# Patient Record
Sex: FEMALE | Race: WHITE | NOT HISPANIC OR LATINO | Employment: UNEMPLOYED | URBAN - METROPOLITAN AREA
[De-identification: names, ages, dates, MRNs, and addresses within clinical notes are randomized per-mention and may not be internally consistent; named-entity substitution may affect disease eponyms.]

---

## 2020-06-22 ENCOUNTER — OFFICE VISIT (OUTPATIENT)
Dept: ORTHOPEDIC SURGERY | Facility: CLINIC | Age: 70
End: 2020-06-22

## 2020-06-22 VITALS — OXYGEN SATURATION: 98 % | BODY MASS INDEX: 29.83 KG/M2 | HEIGHT: 67 IN | WEIGHT: 190.04 LBS | HEART RATE: 70 BPM

## 2020-06-22 DIAGNOSIS — Z02.6 ENCOUNTER RELATED TO WORKER'S COMPENSATION CLAIM: ICD-10-CM

## 2020-06-22 DIAGNOSIS — S52.515A CLOSED NONDISPLACED FRACTURE OF STYLOID PROCESS OF LEFT RADIUS, INITIAL ENCOUNTER: ICD-10-CM

## 2020-06-22 DIAGNOSIS — M25.532 LEFT WRIST PAIN: Primary | ICD-10-CM

## 2020-06-22 DIAGNOSIS — M18.12 ARTHRITIS OF CARPOMETACARPAL (CMC) JOINT OF LEFT THUMB: ICD-10-CM

## 2020-06-22 PROCEDURE — 99204 OFFICE O/P NEW MOD 45 MIN: CPT | Performed by: ORTHOPAEDIC SURGERY

## 2020-06-22 NOTE — PROGRESS NOTES
"    [facility]    Subjective     Chief Complaint   Patient presents with   • Left Wrist - Pain        HPI  Kathy Bergeron is a 69 y.o. female who presents with left wrist pain.  Onset: mechanical fall. The issue has been ongoing for 1 month(s). Pain is a 0/10 on the pain scale. Pain is described as stabbing and shooting. Associated symptoms include pain. The pain is worse with working and leisure; resting, ice and heat improve the pain. Previous treatments have included: bracing.    I have reviewed the following portions of the patient's history:History of Present Illness    She had no pre-existing left wrist pain.  Past Medical History:   Diagnosis Date   • Breast cancer (CMS/HCC)    • Diabetes mellitus (CMS/HCC)       Past Surgical History:   Procedure Laterality Date   • ANKLE OPEN REDUCTION INTERNAL FIXATION Left     2008, 2010   • BREAST BIOPSY     • BREAST LUMPECTOMY  10/2019   • HYSTERECTOMY  2004   • TONSILLECTOMY  1954      Family History   Problem Relation Age of Onset   • Hypertension Mother    • Diabetes Mother    • Heart disease Father    • Diabetes Father      Social History     Socioeconomic History   • Marital status:      Spouse name: Not on file   • Number of children: Not on file   • Years of education: Not on file   • Highest education level: Not on file   Tobacco Use   • Smoking status: Never Smoker   • Smokeless tobacco: Never Used   Substance and Sexual Activity   • Alcohol use: Yes     Frequency: Never     Comment: once a year   • Drug use: Never   • Sexual activity: Defer      Current Outpatient Medications on File Prior to Visit   Medication Sig Dispense Refill   • amLODIPine (NORVASC) 10 MG tablet      • atorvastatin (LIPITOR) 10 MG tablet Take 10 mg by mouth Daily.     • BD INSULIN SYRINGE U/F 31G X 5/16\" 1 ML misc      • hydroCHLOROthiazide (HYDRODIURIL) 25 MG tablet      • labetalol (NORMODYNE) 200 MG tablet      • LANTUS 100 UNIT/ML injection      • " "losartan (COZAAR) 100 MG tablet      • ONETOUCH ULTRA test strip      • rosuvastatin (CRESTOR) 10 MG tablet      • VICTOZA 18 MG/3ML solution pen-injector injection        No current facility-administered medications on file prior to visit.       Allergies   Allergen Reactions   • Oxycodone-Acetaminophen Rash   • Lipitor  [Atorvastatin Calcium] Dizziness   • Lotensin  [Benazepril Hcl] Dizziness        The following portions of the patient's history were reviewed and updated as appropriate: allergies, current medications, past family history, past medical history, past social history, past surgical history and problem list.    Review of Systems   Constitutional: Negative.    HENT: Negative.    Eyes: Negative.    Respiratory: Negative.    Cardiovascular: Negative.    Gastrointestinal: Negative.    Endocrine: Negative.    Genitourinary: Negative.    Musculoskeletal: Positive for arthralgias.   Skin: Negative.    Allergic/Immunologic: Negative.    Neurological: Negative.    Hematological: Negative.    Psychiatric/Behavioral: Negative.         Objective      Physical Exam  Pulse 70   Ht 170.2 cm (67.01\")   Wt 86.2 kg (190 lb 0.6 oz)   SpO2 98%   BMI 29.76 kg/m²     Body mass index is 29.76 kg/m².    GENERAL APPEARANCE: awake, alert & oriented x 3, in no acute distress and well developed, well nourished  PSYCH: normal mood and affect  LUNGS:  breathing nonlabored, no wheezing  EYES: sclera anicteric, pupils equal  CARDIOVASCULAR: palpable pulses dorsalis pedis, palpable posterior tibial bilaterally. Capillary refill less than 2 seconds  INTEGUMENTARY: skin intact, no clubbing, cyanosis  NEUROLOGIC:  Normal Sensation and reflexes       Ortho Exam  Peripheral Vascular   Left Upper Extremity    No cyanotic nail beds    Pink nail beds and rapid capillary refill   Palpation    Radial Pulse - Bilaterally normal    Musculoskeletal   Left Upper Extremity   Radius:    Inspection and Palpation:    Tenderness - exquisitely " tender and about the wrist    Swelling - hematoma    Effusion - none    Muscle tone - no atrophy    Pulses - +2   Deformities/Malalignments/Discrepancies    Normal bony contour    There is a documented closed fracture : location - left - distal end        Imaging/Studies  Imaging Results (Last 7 Days)     Procedure Component Value Units Date/Time    XR Wrist 3+ View Left [[id number]] Resulted:  06/22/20 0916     Updated:  06/22/20 0919    Narrative:       Left Wrist X-Ray  Indication: Pain  AP, Lateral, and Oblique views    Findings:    Hairline fracture of the radial styloid.  Extensive CMC joint and scaphoid   carpal joint arthritis     prior studies were available for comparison.            Assessment/Plan        ICD-10-CM ICD-9-CM   1. Left wrist pain M25.532 719.43   2. Encounter related to worker's compensation claim Z02.6 V70.3   3. Closed nondisplaced fracture of styloid process of left radius, initial encounter S52.515A 813.42   4. Arthritis of carpometacarpal (CMC) joint of left thumb M18.12 716.94       Orders Placed This Encounter   Procedures   • XR Wrist 3+ View Left      She has pretty severe CMC joint arthritis.  This was obviously pre-existing injury.  The injury may have aggravated that in addition to that she has healing of a nondisplaced radial styloid fracture.  I ordered a plan based thumb spica splint her work restrictions are no lifting left arm.  She will follow-up in 4 weeks for an x-ray.  Medical Decision Making  Management Options : over-the-counter medicine and close treatment of fracture or dislocation  Data/Risk: radiology tests and independent visualization of imaging, lab tests, or EMG/NCV    Perry [facility] MD  06/22/20  09:20         EMR Dragon/Transcription disclaimer:  Much of this encounter note is an electronic transcription of spoken language to printed text. Electronic transcription of spoken language may permit erroneous, or at times, nonsensical words or phrases to be  inadvertently transcribed. Although I have reviewed the note for such errors, some may still exist.

## 2020-07-20 ENCOUNTER — OFFICE VISIT (OUTPATIENT)
Dept: ORTHOPEDIC SURGERY | Facility: CLINIC | Age: 70
End: 2020-07-20

## 2020-07-20 VITALS — BODY MASS INDEX: 29.7 KG/M2 | OXYGEN SATURATION: 98 % | HEIGHT: 67 IN | HEART RATE: 83 BPM | WEIGHT: 189.2 LBS

## 2020-07-20 DIAGNOSIS — S52.515D CLOSED NONDISPLACED FRACTURE OF STYLOID PROCESS OF LEFT RADIUS WITH ROUTINE HEALING, SUBSEQUENT ENCOUNTER: ICD-10-CM

## 2020-07-20 DIAGNOSIS — Z02.6 ENCOUNTER RELATED TO WORKER'S COMPENSATION CLAIM: ICD-10-CM

## 2020-07-20 DIAGNOSIS — M25.532 LEFT WRIST PAIN: ICD-10-CM

## 2020-07-20 DIAGNOSIS — Z09 FRACTURE FOLLOW-UP: Primary | ICD-10-CM

## 2020-07-20 PROCEDURE — 99213 OFFICE O/P EST LOW 20 MIN: CPT | Performed by: ORTHOPAEDIC SURGERY

## 2020-07-20 NOTE — PROGRESS NOTES
"    [facility]    Subjective     Chief Complaint   Patient presents with   • Follow-up     4 week recheck - Closed nondisplaced fracture of styloid process of left radius        HPI  Kathy Bergeron is a 69 y.o. female.  She is follow-up the left wrist radial styloid fracture.  She is Worker's Comp. injury from 831.  She feels she is doing better.  She has been in a thumb spica brace.  Past Medical History:   Diagnosis Date   • Breast cancer (CMS/HCC)    • Diabetes mellitus (CMS/HCC)       Past Surgical History:   Procedure Laterality Date   • ANKLE OPEN REDUCTION INTERNAL FIXATION Left     2008, 2010   • BREAST BIOPSY     • BREAST LUMPECTOMY  10/2019   • HYSTERECTOMY  2004   • TONSILLECTOMY  1954      Family History   Problem Relation Age of Onset   • Hypertension Mother    • Diabetes Mother    • Heart disease Father    • Diabetes Father      Social History     Socioeconomic History   • Marital status:      Spouse name: Not on file   • Number of children: Not on file   • Years of education: Not on file   • Highest education level: Not on file   Tobacco Use   • Smoking status: Never Smoker   • Smokeless tobacco: Never Used   Substance and Sexual Activity   • Alcohol use: Yes     Frequency: Never     Comment: once a year   • Drug use: Never   • Sexual activity: Defer      Current Outpatient Medications on File Prior to Visit   Medication Sig Dispense Refill   • amLODIPine (NORVASC) 10 MG tablet      • atorvastatin (LIPITOR) 10 MG tablet Take 10 mg by mouth Daily.     • BD INSULIN SYRINGE U/F 31G X 5/16\" 1 ML misc      • hydroCHLOROthiazide (HYDRODIURIL) 25 MG tablet      • labetalol (NORMODYNE) 200 MG tablet      • LANTUS 100 UNIT/ML injection      • losartan (COZAAR) 100 MG tablet      • ONETOUCH ULTRA test strip      • rosuvastatin (CRESTOR) 10 MG tablet      • VICTOZA 18 MG/3ML solution pen-injector injection        No current facility-administered medications on file prior to visit. " "      Allergies   Allergen Reactions   • Oxycodone-Acetaminophen Rash   • Lipitor  [Atorvastatin Calcium] Dizziness   • Lotensin  [Benazepril Hcl] Dizziness        The following portions of the patient's history were reviewed and updated as appropriate: allergies, current medications, past family history, past medical history, past social history, past surgical history and problem list.    Review of Systems   Constitutional: Negative.    HENT: Negative.    Eyes: Negative.    Respiratory: Negative.    Cardiovascular: Negative.    Gastrointestinal: Negative.    Endocrine: Negative.    Genitourinary: Negative.    Musculoskeletal: Positive for arthralgias and joint swelling.   Skin: Negative.    Allergic/Immunologic: Negative.    Neurological: Negative.    Hematological: Negative.    Psychiatric/Behavioral: Negative.         Objective      Physical Exam  Pulse 83   Ht 170.2 cm (67.01\")   Wt 85.8 kg (189 lb 3.2 oz)   SpO2 98%   BMI 29.63 kg/m²     Body mass index is 29.63 kg/m².    GENERAL APPEARANCE: awake, alert & oriented x 3, in no acute distress and well developed, well nourished  PSYCH: normal mood and affect  LUNGS:  breathing nonlabored, no wheezing  Her left wrist has stiffness and weakness.  Minimal tenderness at fracture site.  Minimal swelling at the fracture site.    Imaging/Studies  Imaging Results (Last 7 Days)     Procedure Component Value Units Date/Time    XR Wrist 3+ View Left [[id number]] Resulted:  07/20/20 0943     Updated:  07/20/20 0948    Narrative:       Left Wrist X-Ray  Indication: Pain  AP, Lateral, and Oblique views    Findings:  Healing of radial styloid fracture with the pre-existing CMC joint   arthritis  No bony lesion  Normal soft tissues  prior studies were available for comparison.            Assessment/Plan        ICD-10-CM ICD-9-CM   1. Fracture follow-up Z09 V67.4   2. Left wrist pain M25.532 719.43   3. Encounter related to worker's compensation claim Z02.6 V70.3   4. Closed " nondisplaced fracture of styloid process of left radius with routine healing, subsequent encounter S52.515D V54.12       Orders Placed This Encounter   Procedures   • XR Wrist 3+ View Left   • Ambulatory Referral to Physical Therapy Evaluate and treat, Ortho      She will go to physical therapy at [facility].  She will follow-up in 1 month.  Her work restrictions are no lifting more than 5 pounds.  Medical Decision Making  Management Options : over-the-counter medicine and physical/occupational therapy  Data/Risk: radiology tests and independent visualization of imaging, lab tests, or EMG/NCV    Perry [facility] MD  07/20/20  09:51         EMR Dragon/Transcription disclaimer:  Much of this encounter note is an electronic transcription of spoken language to printed text. Electronic transcription of spoken language may permit erroneous, or at times, nonsensical words or phrases to be inadvertently transcribed. Although I have reviewed the note for such errors, some may still exist.

## 2020-07-28 ENCOUNTER — TREATMENT (OUTPATIENT)
Dept: PHYSICAL THERAPY | Facility: CLINIC | Age: 70
End: 2020-07-28

## 2020-07-28 DIAGNOSIS — M25.532 LEFT WRIST PAIN: Primary | ICD-10-CM

## 2020-07-28 PROCEDURE — 97110 THERAPEUTIC EXERCISES: CPT | Performed by: PHYSICAL THERAPIST

## 2020-07-28 PROCEDURE — 97162 PT EVAL MOD COMPLEX 30 MIN: CPT | Performed by: PHYSICAL THERAPIST

## 2020-07-28 NOTE — PROGRESS NOTES
Physical Therapy Initial Evaluation and Plan of Care      Patient: Kathy Bergeron   : 1950  Diagnosis/ICD-10 Code:  The encounter diagnosis was Left wrist pain.   Referring practitioner: Perry [facility] MD  Date of Initial Visit: Type: THERAPY  Noted: 2020  Today's Date: 2020  Patient seen for 1 sessions         Kathy Bergeron reports:  L wrist pain, decreased ROM, decreased strength and functional abilities following fracture with immobilization.  Subjective Questionnaire: QuickDASH: 40    Subjective Evaluation    History of Present Illness  Date of onset: 2020  Mechanism of injury: Pt fell and caught herself on her L hand while working at [employer].  She has been able to continue to work in a light duty.  She has 5# lifting restriction with L hand.    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  Has been working as a  since the injury to her wrist. Pain  Current pain ratin  At best pain ratin  At worst pain ratin  Location: L wrist  Quality: tight  Relieving factors: rest, support and change in position  Exacerbated by: pronation/supination.  Progression: improved    Hand dominance: right    Diagnostic Tests  Abnormal x-ray: healing radial styloid fracture L wrist.    Treatments  Previous treatment: immobilization  Patient Goals  Patient goals for therapy: increased strength and increased motion           Treatment  Exercise 1  Exercise Name 1: Initial HEP education provided, including illustrations and practice in clinic.              Objective          Palpation     Additional Palpation Details  Generalized tenderness at radial aspect of left wrist.    Neurological Testing     Sensation     Wrist/Hand   Left   Intact: light touch    Right   Intact: light touch    Active Range of Motion     Left Elbow   Forearm supination: 71 degrees    Forearm pronation: 75 degrees     Right Elbow   Forearm supination: 90 degrees   Forearm pronation: 85 degrees     Left Wrist   Wrist flexion: 45 degrees   Wrist extension: 50 degrees   Radial deviation: 15 degrees   Ulnar deviation: 25 degrees     Right Wrist   Wrist flexion: 60 degrees   Wrist extension: 64 degrees   Radial deviation: 20 degrees   Ulnar deviation: 32 degrees     Additional Active Range of Motion Details  Able to fully open and close fist.    Strength/Myotome Testing     Left Wrist/Hand   Wrist extension: 4  Wrist flexion: 4  Radial deviation: 4  Ulnar deviation: 4     (2nd hand position)     Trial 1: 21 lbs    Trial 2: 30 lbs    Trial 3: 27 lbs    Average: 26 lbs    Thumb Strength  Key/Lateral Pinch     Trial 1: 9 lbs  Tip/Two-Point Pinch     Trial 1: 7 lbs  Palmar/Three-Point Pinch     Trial 1: 7 lbs    Right Wrist/Hand   Normal wrist strength     (2nd hand position)     Trial 1: 55 lbs    Trial 2: 60 lbs    Trial 3: 68 lbs    Average: 61 lbs    Thumb Strength   Key/Lateral Pinch     Trial 1: 21 lbs  Tip/Two-Point Pinch     Trial 1: 12 lbs  Palmar/Three-Point Pinch     Trial 1: 14 lbs          Assessment & Plan     Assessment  Impairments: abnormal or restricted ROM, activity intolerance, impaired physical strength, lacks appropriate home exercise program and pain with function  Assessment details: Pt sustained L radial styloid fracture in a fall on 5-.  She has been immobilized in splint and presents today to begin rehabilitation.  Functional Limitations: carrying objects, lifting, pulling, pushing, uncomfortable because of pain and unable to perform repetitive tasks  Goals  Plan Goals: Pt. demonstrates independence and compliance with initial HEP.  Pt. reports reduction in pain intensity to no worse than 3/10 on NPRS.  AROM of L wrist shows improvement over baseline measures.  Functional outcome measure is improved by 10 points.    Pt. demonstrates independence in advanced HEP  for ongoing improvement.  AROM of L wrist is sufficient for performance of daily activities.  L wrist and hand strength is improved to 4+/5 or better to allow participation in desired activities.  Functional outcome measure is improved to <20 points.          Plan  Therapy options: will be seen for skilled physical therapy services  Planned modality interventions: cryotherapy, thermotherapy (hydrocollator packs) and iontophoresis  Planned therapy interventions: manual therapy, neuromuscular re-education, strengthening, stretching, therapeutic activities, joint mobilization, home exercise program, functional ROM exercises and flexibility  Frequency: 2x week  Duration in visits: 12  Treatment plan discussed with: patient  Plan details: PT per POC, addressing noted deficits in ROM, strength, and functional abilities of left wrist and hand.        Timed:  Manual Therapy:         mins  39318;  Therapeutic Exercise:    10     mins  01958;     Neuromuscular Tiara:        mins  56409;    Therapeutic Activity:          mins  38837;     Gait Training:           mins  67920;     Ultrasound:          mins  31620;    Electrical Stimulation:         mins  37697 ( );    Untimed:  Electrical Stimulation:         mins  37391 ( );  Mechanical Traction:         mins  07831;     Timed Treatment:   10   mins   Total Treatment:     50   mins    PT SIGNATURE: Pepper Galindo, BREA   DATE TREATMENT INITIATED: 7/28/2020    Initial Certification  Certification Period: 10/26/2020  I certify that the therapy services are furnished while this patient is under my care.  The services outlined above are required by this patient, and will be reviewed every 90 days.     PHYSICIAN: Perry [facility] MD      DATE:     Please sign and return via fax to  .. Thank you, [facility].

## 2020-08-04 ENCOUNTER — TREATMENT (OUTPATIENT)
Dept: PHYSICAL THERAPY | Facility: CLINIC | Age: 70
End: 2020-08-04

## 2020-08-04 DIAGNOSIS — M25.532 LEFT WRIST PAIN: Primary | ICD-10-CM

## 2020-08-04 PROCEDURE — 97110 THERAPEUTIC EXERCISES: CPT | Performed by: PHYSICAL THERAPIST

## 2020-08-04 PROCEDURE — 97140 MANUAL THERAPY 1/> REGIONS: CPT | Performed by: PHYSICAL THERAPIST

## 2020-08-04 NOTE — PROGRESS NOTES
Physical Therapy Daily Progress Note  VISIT: 2      Kathy Bergeron reports: she has been doing her exercises.  She wants to make sure she is doing them correctly.    Subjective     Treatment  Pre-treatment pain:  0  Post-treatment pain:  0  Exercise 1  Exercise Name 1: Brief review of existing HEP.  Exercise 2  Exercise Name 2: Progression of HEP to include strengthening of wrist.  Exercise 3  Exercise Name 3: wrist flexion  Equipment/Resistance 3: yellow band  Exercise 3 Home Program: Yes  Exercise 4  Exercise Name 4: wrist extension  Equipment/Resistance 4: yellow band  Exercise 4 Home Program: Yes  Exercise 5  Exercise Name 5: radial deviation  Equipment/Resistance 5: yellow band  Exercise 5 Home Program: Yes  Exercise 6  Exercise Name 6: ulnar deviation  Equipment/Resistance 6: yellow band  Exercise 6 Home Program: Yes  Exercise 7  Exercise Name 7: supination  Equipment/Resistance 7: yellow band  Exercise 7 Home Program: Yes  Exercise 8  Exercise Name 8: pronation  Equipment/Resistance 8: yellow band  Exercise 8 Home Program: Yes    Manual Rx 1  Manual Rx 1 Location: L wrist  Manual Rx 1 Type: multi-directional mobilization of L wrist complex to improve motion in all planes.        Objective     Assessment & Plan     Assessment  Assessment details: Pt is compliant with HEP and motivated to return to her usual job duties in [employer].    Plan  Plan details: Continue PT.  Progress exercises as patient tolerates.               Timed:  Manual Therapy:    15     mins  74481;  Therapeutic Exercise:    25     mins  06172;     Neuromuscular Tiara:        mins  52436;    Therapeutic Activity:          mins  02885;     Gait Training:           mins  85522;     Ultrasound:          mins  98893;    Electrical Stimulation:         mins  35538 ( );    Untimed:  Electrical Stimulation:         mins  13545 ( );  Mechanical Traction:         mins  48271;     Timed Treatment:   40   mins   Total Treatment:      40   mins      Pepper Galindo, PT  Physical Therapist

## 2020-08-10 ENCOUNTER — TREATMENT (OUTPATIENT)
Dept: PHYSICAL THERAPY | Facility: CLINIC | Age: 70
End: 2020-08-10

## 2020-08-10 DIAGNOSIS — M25.532 LEFT WRIST PAIN: Primary | ICD-10-CM

## 2020-08-10 PROCEDURE — 97140 MANUAL THERAPY 1/> REGIONS: CPT | Performed by: PHYSICAL THERAPIST

## 2020-08-10 PROCEDURE — 97110 THERAPEUTIC EXERCISES: CPT | Performed by: PHYSICAL THERAPIST

## 2020-08-10 NOTE — PROGRESS NOTES
Physical Therapy Daily Progress Note  VISIT: 3      Kathy Bergeron reports: no pain upon arrival today.  She likes her home exercises.  She has not done as much with the putty as she has with theraband.  She is eager to get back to her usual job, and feels that she could do it.    Subjective     Treatment  Pre-treatment pain:  0  Post-treatment pain:  0  Exercise 1  Exercise Name 1: wrist exerciser   Exercise 2  Exercise Name 2: power web -green  Exercise 3  Exercise Name 3: velcro board wrist flex/ext  Exercise 4  Exercise Name 4: velcro board forearm pronation and supination  Exercise 5  Exercise Name 5: velcro board-key  and turn  Exercise 6  Exercise Name 6: hand/finger exerciser  Equipment/Resistance 6: red (3#)  Exercise 7  Exercise Name 7: weighted towel scrunch  Equipment/Resistance 7: 4# weight  Exercise 8  Exercise Name 8: hammer pron/sup    Manual Rx 1  Manual Rx 1 Location: L wrist  Manual Rx 1 Type: multi-directional mobilization of L wrist complex to improve motion in all planes.        Objective     Assessment & Plan     Assessment  Assessment details: Pt demonstrates improvement in use of left hand with minimal discomfort.    Plan  Plan details: Continue PT, addressing ROM and strength deficits in L hand and wrist.               Timed:  Manual Therapy:    15     mins  30065;  Therapeutic Exercise:    30     mins  83796;     Neuromuscular Tiara:        mins  11453;    Therapeutic Activity:          mins  31261;     Gait Training:           mins  94099;     Ultrasound:          mins  92776;    Electrical Stimulation:         mins  88650 ( );    Untimed:  Electrical Stimulation:         mins  79196 ( );  Mechanical Traction:         mins  32561;     Timed Treatment:   45   mins   Total Treatment:     45   mins      Pepper Galindo, PT  Physical Therapist

## 2020-08-12 ENCOUNTER — TREATMENT (OUTPATIENT)
Dept: PHYSICAL THERAPY | Facility: CLINIC | Age: 70
End: 2020-08-12

## 2020-08-12 DIAGNOSIS — M25.532 LEFT WRIST PAIN: Primary | ICD-10-CM

## 2020-08-12 PROCEDURE — 97140 MANUAL THERAPY 1/> REGIONS: CPT | Performed by: PHYSICAL THERAPIST

## 2020-08-12 PROCEDURE — 97110 THERAPEUTIC EXERCISES: CPT | Performed by: PHYSICAL THERAPIST

## 2020-08-12 NOTE — PROGRESS NOTES
Physical Therapy Daily Progress Note  VISIT: 4      Kathy Bergeron reports: no pain upon arrival today.  She had some aching last night after using her arm more for daily tasks.    Subjective     Treatment  Pre-treatment pain:  0  Post-treatment pain:  0  Exercise 1  Exercise Name 1: wrist flexor stretch  Exercise 2  Exercise Name 2: wrist extensor stretch  Exercise 3  Exercise Name 3: putty gross   Exercise 4  Exercise Name 4: rubber band resistance for 5th finger flexion  Exercise 5  Exercise Name 5: manually resisted 5th finger flexion    Manual Rx 1  Manual Rx 1 Location: L wrist  Manual Rx 1 Type: multi-directional mobilization of L wrist complex to improve motion in all planes.        Objective     Assessment & Plan     Assessment  Assessment details: Pt is using her left arm more for daily activities.  She is able to manipulate buttons and jewelry.  She washed dishes.  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    Plan  Plan details: Continue PT, addressing ROM and strength limitations in L wrist and hand.               Timed:  Manual Therapy:    15     mins  47484;  Therapeutic Exercise:    15     mins  91503;     Neuromuscular Tiara:        mins  86190;    Therapeutic Activity:          mins  07620;     Gait Training:           mins  63838;     Ultrasound:          mins  29876;    Electrical Stimulation:         mins  39429 ( );    Untimed:  Electrical Stimulation:         mins  79544 ( );  Mechanical Traction:         mins  87722;     Timed Treatment:   30   mins   Total Treatment:     30   mins      Pepper Galindo PT  Physical Therapist

## 2020-08-19 ENCOUNTER — OFFICE VISIT (OUTPATIENT)
Dept: ORTHOPEDIC SURGERY | Facility: CLINIC | Age: 70
End: 2020-08-19

## 2020-08-19 ENCOUNTER — TREATMENT (OUTPATIENT)
Dept: PHYSICAL THERAPY | Facility: CLINIC | Age: 70
End: 2020-08-19

## 2020-08-19 VITALS — OXYGEN SATURATION: 97 % | HEIGHT: 67 IN | BODY MASS INDEX: 28.66 KG/M2 | WEIGHT: 182.6 LBS | HEART RATE: 82 BPM

## 2020-08-19 DIAGNOSIS — Z09 FRACTURE FOLLOW-UP: ICD-10-CM

## 2020-08-19 DIAGNOSIS — M25.532 LEFT WRIST PAIN: Primary | ICD-10-CM

## 2020-08-19 PROCEDURE — 99212 OFFICE O/P EST SF 10 MIN: CPT | Performed by: ORTHOPAEDIC SURGERY

## 2020-08-19 PROCEDURE — 97140 MANUAL THERAPY 1/> REGIONS: CPT | Performed by: PHYSICAL THERAPIST

## 2020-08-19 PROCEDURE — 97110 THERAPEUTIC EXERCISES: CPT | Performed by: PHYSICAL THERAPIST

## 2020-08-19 RX ORDER — METFORMIN HYDROCHLORIDE 500 MG/1
TABLET, EXTENDED RELEASE ORAL
COMMUNITY
Start: 2020-06-27

## 2020-08-19 NOTE — PROGRESS NOTES
"    [facility] Note    Subjective     Chief Complaint   Patient presents with   • Follow-up     1 month follow up for Closed nondisplaced fracture of styloid process of left radius        HPI  Kathy Bergeron is a 69 y.o. female.  She doing great with no complaints.  Pain is 0.    Past Medical History:   Diagnosis Date   • Breast cancer (CMS/HCC)    • Diabetes mellitus (CMS/HCC)       Past Surgical History:   Procedure Laterality Date   • ANKLE OPEN REDUCTION INTERNAL FIXATION Left     2008, 2010   • BREAST BIOPSY     • BREAST LUMPECTOMY  10/2019   • HYSTERECTOMY  2004   • TONSILLECTOMY  1954      Family History   Problem Relation Age of Onset   • Hypertension Mother    • Diabetes Mother    • Heart disease Father    • Diabetes Father      Social History     Socioeconomic History   • Marital status:      Spouse name: Not on file   • Number of children: Not on file   • Years of education: Not on file   • Highest education level: Not on file   Tobacco Use   • Smoking status: Never Smoker   • Smokeless tobacco: Never Used   Substance and Sexual Activity   • Alcohol use: Yes     Frequency: Never     Comment: once a year   • Drug use: Never   • Sexual activity: Defer      Current Outpatient Medications on File Prior to Visit   Medication Sig Dispense Refill   • amLODIPine (NORVASC) 10 MG tablet      • atorvastatin (LIPITOR) 10 MG tablet Take 10 mg by mouth Daily.     • BD INSULIN SYRINGE U/F 31G X 5/16\" 1 ML misc      • hydroCHLOROthiazide (HYDRODIURIL) 25 MG tablet      • labetalol (NORMODYNE) 200 MG tablet      • LANTUS 100 UNIT/ML injection      • losartan (COZAAR) 100 MG tablet      • metFORMIN ER (GLUCOPHAGE-XR) 500 MG 24 hr tablet      • ONETOUCH ULTRA test strip      • rosuvastatin (CRESTOR) 10 MG tablet      • VICTOZA 18 MG/3ML solution pen-injector injection        No current facility-administered medications on file prior to visit.       Allergies   Allergen Reactions   • " "Oxycodone-Acetaminophen Rash   • Lipitor  [Atorvastatin Calcium] Dizziness   • Lotensin  [Benazepril Hcl] Dizziness        The following portions of the patient's history were reviewed and updated as appropriate: allergies, current medications, past family history, past medical history, past social history, past surgical history and problem list.    Review of Systems   Constitutional: Negative.    HENT: Negative.    Eyes: Negative.    Respiratory: Negative.    Cardiovascular: Negative.    Gastrointestinal: Negative.    Endocrine: Negative.    Genitourinary: Negative.    Musculoskeletal: Positive for arthralgias.   Skin: Negative.    Allergic/Immunologic: Negative.    Neurological: Negative.    Hematological: Negative.    Psychiatric/Behavioral: Negative.         Objective      Physical Exam  Pulse 82   Ht 170.2 cm (67.01\")   Wt 82.8 kg (182 lb 9.6 oz)   SpO2 97%   BMI 28.59 kg/m²     Body mass index is 28.59 kg/m².    GENERAL APPEARANCE: awake, alert & oriented x 3, in no acute distress and well developed, well nourished  PSYCH: normal mood and affect  LUNGS:  breathing nonlabored, no wheezing  Left wrist has full strength.  Lacks about 10 degrees of full dorsiflexion.  Otherwise completely normal.    Imaging/Studies  Imaging Results (Last 7 Days)     Procedure Component Value Units Date/Time    XR Wrist 3+ View Left [[id number]] Resulted:  08/19/20 1006     Updated:  08/19/20 1008    Narrative:       Left Wrist X-Ray  Indication: Pain  AP, Lateral, and Oblique views    Findings:  Healing of radial styloid fracture  No bony lesion  Normal soft tissues  Normal joint spaces    prior studies were available for comparison.            Assessment/Plan        ICD-10-CM ICD-9-CM   1. Left wrist pain M25.532 719.43   2. Fracture follow-up Z09 V67.4       Orders Placed This Encounter   Procedures   • XR Wrist 3+ View Left      She has good healing of her left radial styloid fracture.  She will continue with physical " therapy and follow-up as needed.    Medical Decision Making  Management Options : over-the-counter medicine and close treatment of fracture or dislocation  Data/Risk: radiology tests and independent visualization of imaging, lab tests, or EMG/NCV    Perry Moore MD  08/19/20  10:09         EMR Dragon/Transcription disclaimer:  Much of this encounter note is an electronic transcription of spoken language to printed text. Electronic transcription of spoken language may permit erroneous, or at times, nonsensical words or phrases to be inadvertently transcribed. Although I have reviewed the note for such errors, some may still exist.

## 2020-08-19 NOTE — PROGRESS NOTES
Physical Therapy Daily Progress Note/Discharge Note    Patient: Kathy Bergeron   : 1950  Diagnosis/ICD-10 Code:  The encounter diagnosis was Left wrist pain.   Referring practitioner: Perry Moore MD  Date of Initial Visit: Type: THERAPY  Noted: 2020  Today's Date: 2020  Visit:  5     Kathy Bergeron reports: she saw her doctor.  The fracture has healed.  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    Subjective     Treatment  Pre-treatment pain:  0  Post-treatment pain:  0          Quick DASH score 6.82 (initially 40)      Objective          Active Range of Motion     Left Wrist   Wrist flexion: 51 degrees   Wrist extension: 54 degrees   Radial deviation: 15 degrees   Ulnar deviation: 25 degrees     Strength/Myotome Testing     Left Wrist/Hand   Wrist extension: 5  Wrist flexion: 5  Radial deviation: 4+  Ulnar deviation: 5     (2nd hand position)     Trial 1: 32 lbs    Trial 2: 36 lbs    Trial 3: 37 lbs    Average: 35 lbs    Thumb Strength  Key/Lateral Pinch     Trial 1: 12 lbs  Tip/Two-Point Pinch     Trial 1: 11 lbs  Palmar/Three-Point Pinch     Trial 1: 9 lbs        Assessment & Plan     Assessment  Assessment details: Pt has achieved all goals initially established.  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    Plan  Plan details: DC PT.        Pt. demonstrates independence and compliance with initial HEP-met.  Pt. reports reduction in pain intensity to no worse than 3/10 on NPRS-met.  AROM of L wrist shows improvement over baseline measures-met.  Functional outcome measure is improved by 10 points-met.    Pt. demonstrates independence in advanced HEP for ongoing improvement-met.  AROM of L wrist is sufficient for performance of daily activities-met.  L wrist and hand strength is improved to 4+/5 or better to allow participation in desired activities-met.  Functional outcome measure is improved to <20 points-met.        Timed:  Manual Therapy:     10    mins  65605;  Therapeutic Exercise:    20     mins  47544;     Neuromuscular Tiara:        mins  31611;    Therapeutic Activity:          mins  17198;     Gait Training:           mins  20319;     Ultrasound:          mins  16882;    Electrical Stimulation:         mins  47476 ( );    Untimed:  Electrical Stimulation:         mins  71819 ( );  Mechanical Traction:         mins  23494;     Timed Treatment:   30   mins   Total Treatment:     30   mins      Pepper Galindo PT  Physical Therapist